# Patient Record
(demographics unavailable — no encounter records)

---

## 2025-05-08 NOTE — HISTORY OF PRESENT ILLNESS
[TextBox_4] : 40yo presents for annual exam no complaints  [Mammogramdate] : 2024 [PapSmeardate] : 2024